# Patient Record
Sex: FEMALE | Race: WHITE
[De-identification: names, ages, dates, MRNs, and addresses within clinical notes are randomized per-mention and may not be internally consistent; named-entity substitution may affect disease eponyms.]

---

## 2017-10-26 ENCOUNTER — HOSPITAL ENCOUNTER (OUTPATIENT)
Dept: HOSPITAL 92 - SCSMAMMO | Age: 57
Discharge: HOME | End: 2017-10-26
Attending: OBSTETRICS & GYNECOLOGY
Payer: COMMERCIAL

## 2017-10-26 DIAGNOSIS — Z12.31: Primary | ICD-10-CM

## 2017-10-26 PROCEDURE — G0202 SCR MAMMO BI INCL CAD: HCPCS

## 2017-10-26 PROCEDURE — 77067 SCR MAMMO BI INCL CAD: CPT

## 2017-10-26 NOTE — MMO
BILATERAL DIGITAL SCREENING MAMMOGRAMS:

 

Date:  10/26/17 

 

This patient's mammogram was interpreted with the assistance of computer-aided detection.  

 

Comparison made with exams of 09/21/16, 09/14/15, and 09/11/14. 

 

FINDINGS:

The breast tissue is heterogeneously dense, which may reduce the sensitivity of mammography. Benign 
calcifications are again seen bilaterally. No suspicious masses, calcifications, or architectural di
stortion is identified. 

 

IMPRESSION: 

 

BIRADS 2:  Benign Finding(s)

 

Annual screening mammography is recommended. 

 

 

POS: [facility]

## 2018-11-26 ENCOUNTER — HOSPITAL ENCOUNTER (OUTPATIENT)
Dept: HOSPITAL 92 - BICMAMMO | Age: 58
Discharge: HOME | End: 2018-11-26
Attending: OBSTETRICS & GYNECOLOGY
Payer: COMMERCIAL

## 2018-11-26 DIAGNOSIS — Z12.31: Primary | ICD-10-CM

## 2018-11-26 PROCEDURE — 77063 BREAST TOMOSYNTHESIS BI: CPT

## 2018-11-26 PROCEDURE — 77067 SCR MAMMO BI INCL CAD: CPT

## 2020-01-06 ENCOUNTER — HOSPITAL ENCOUNTER (OUTPATIENT)
Dept: HOSPITAL 92 - BICMAMMO | Age: 60
Discharge: HOME | End: 2020-01-06
Attending: OBSTETRICS & GYNECOLOGY
Payer: COMMERCIAL

## 2020-01-06 DIAGNOSIS — Z12.31: Primary | ICD-10-CM

## 2020-01-06 PROCEDURE — 77063 BREAST TOMOSYNTHESIS BI: CPT

## 2020-01-06 PROCEDURE — 77067 SCR MAMMO BI INCL CAD: CPT

## 2020-01-06 NOTE — MMO
Bilateral MAMMO Bilat Screen [facility].

 

CLINICAL HISTORY:

Patient is 59 years old and is seen for screening. The patient has no family

history of breast cancer.  The patient has no personal history of cancer.

 

VIEWS:

The views performed were:  bilateral craniocaudal with tomosynthesis and

bilateral mediolateral oblique with tomosynthesis.

 

FILMS COMPARED:

The present examination has been compared to prior imaging studies performed at

[facility] on 10/26/2017, and at [facility] on 09/14/2015,

09/21/2016 and 11/26/2018.

 

This study has been interpreted with the assistance of computer-aided detection.

 

MAMMOGRAM FINDINGS:

The breasts are extremely dense, which may lower the sensitivity of mammography.

 

There are no suspicious masses, suspicious calcifications, or new areas of

architectural distortion.

 

IMPRESSION:

THERE IS NO MAMMOGRAPHIC EVIDENCE OF MALIGNANCY.

 

A ROUTINE FOLLOW-UP MAMMOGRAM IN 1 YEAR IS RECOMMENDED.

 

THE RESULTS OF THIS EXAM WERE SENT TO THE PATIENT.

 

ACR BI-RADS Category 1 - Negative

 

MAMMOGRAPHY NOTE:

 1. A negative mammogram report should not delay a biopsy if a dominant of

 clinically suspicious mass is present.

 2. Approximately 10% to 15% of breast cancers are not detected by

 mammography.

 3. Adenosis and dense breasts may obscure an underlying neoplasm.

 

 

Reported by: NUNU CONCEPCION MD

Electonically Signed: [id number]